# Patient Record
Sex: MALE | Race: OTHER | Employment: OTHER | URBAN - METROPOLITAN AREA
[De-identification: names, ages, dates, MRNs, and addresses within clinical notes are randomized per-mention and may not be internally consistent; named-entity substitution may affect disease eponyms.]

---

## 2022-07-26 ENCOUNTER — COMPREHENSIVE EXAM (OUTPATIENT)
Dept: URBAN - METROPOLITAN AREA CLINIC 32 | Facility: CLINIC | Age: 87
End: 2022-07-26

## 2022-07-26 DIAGNOSIS — H35.3131: ICD-10-CM

## 2022-07-26 DIAGNOSIS — H26.493: ICD-10-CM

## 2022-07-26 DIAGNOSIS — H04.123: ICD-10-CM

## 2022-07-26 DIAGNOSIS — H43.813: ICD-10-CM

## 2022-07-26 PROCEDURE — 92250 FUNDUS PHOTOGRAPHY W/I&R: CPT

## 2022-07-26 PROCEDURE — 99214 OFFICE O/P EST MOD 30 MIN: CPT

## 2022-07-26 PROCEDURE — 92015 DETERMINE REFRACTIVE STATE: CPT

## 2022-07-26 ASSESSMENT — KERATOMETRY
OS_K2POWER_DIOPTERS: 41.50
OD_AXISANGLE2_DEGREES: 85
OS_K1POWER_DIOPTERS: 42.25
OD_K2POWER_DIOPTERS: 41.25
OS_AXISANGLE_DEGREES: 5
OD_AXISANGLE_DEGREES: 175
OS_AXISANGLE2_DEGREES: 95
OD_K1POWER_DIOPTERS: 42.75

## 2022-07-26 ASSESSMENT — TONOMETRY
OD_IOP_MMHG: 12
OS_IOP_MMHG: 11

## 2022-07-27 ASSESSMENT — VISUAL ACUITY
OD_GLARE: 20/60
OD_CC: J1+
OS_GLARE: 20/50
OD_CC: 20/30+2
OS_CC: J1+
OS_CC: 20/20

## 2022-08-16 ENCOUNTER — SURGERY/PROCEDURE (OUTPATIENT)
Dept: URBAN - METROPOLITAN AREA CLINIC 32 | Facility: CLINIC | Age: 87
End: 2022-08-16

## 2022-08-16 DIAGNOSIS — H26.491: ICD-10-CM

## 2022-08-16 PROCEDURE — 66821 AFTER CATARACT LASER SURGERY: CPT

## 2022-08-18 ASSESSMENT — KERATOMETRY
OD_AXISANGLE_DEGREES: 175
OS_AXISANGLE_DEGREES: 5
OS_K1POWER_DIOPTERS: 42.25
OD_K2POWER_DIOPTERS: 41.25
OD_K1POWER_DIOPTERS: 42.75
OD_AXISANGLE2_DEGREES: 85
OS_AXISANGLE2_DEGREES: 95
OS_K2POWER_DIOPTERS: 41.50

## 2022-09-06 ENCOUNTER — SURGERY/PROCEDURE (OUTPATIENT)
Dept: URBAN - METROPOLITAN AREA CLINIC 32 | Facility: CLINIC | Age: 87
End: 2022-09-06

## 2022-09-06 DIAGNOSIS — H26.492: ICD-10-CM

## 2022-09-06 PROCEDURE — 66821 AFTER CATARACT LASER SURGERY: CPT

## 2022-09-08 ASSESSMENT — KERATOMETRY
OD_AXISANGLE_DEGREES: 175
OS_AXISANGLE_DEGREES: 5
OS_K2POWER_DIOPTERS: 41.50
OD_K1POWER_DIOPTERS: 42.75
OD_AXISANGLE2_DEGREES: 85
OS_K1POWER_DIOPTERS: 42.25
OD_K2POWER_DIOPTERS: 41.25
OS_AXISANGLE2_DEGREES: 95

## 2022-09-13 ENCOUNTER — POST-OP (OUTPATIENT)
Dept: URBAN - METROPOLITAN AREA CLINIC 32 | Facility: CLINIC | Age: 87
End: 2022-09-13

## 2022-09-13 DIAGNOSIS — Z98.890: ICD-10-CM

## 2022-09-13 PROCEDURE — 99024 POSTOP FOLLOW-UP VISIT: CPT

## 2022-09-13 PROCEDURE — 92015 DETERMINE REFRACTIVE STATE: CPT

## 2022-09-13 ASSESSMENT — KERATOMETRY
OD_AXISANGLE_DEGREES: 175
OS_AXISANGLE2_DEGREES: 95
OD_K2POWER_DIOPTERS: 41.25
OS_AXISANGLE_DEGREES: 5
OS_K1POWER_DIOPTERS: 42.25
OS_K2POWER_DIOPTERS: 41.75
OD_K1POWER_DIOPTERS: 42.75
OD_AXISANGLE2_DEGREES: 85

## 2022-09-13 ASSESSMENT — TONOMETRY
OS_IOP_MMHG: 12
OD_IOP_MMHG: 11

## 2022-09-13 ASSESSMENT — VISUAL ACUITY
OD_CC: 20/30-2
OS_CC: 20/25+2
OD_CC: J1+
OS_CC: J1+

## 2022-11-21 ENCOUNTER — EMERGENCY VISIT (OUTPATIENT)
Dept: URBAN - METROPOLITAN AREA CLINIC 32 | Facility: CLINIC | Age: 87
End: 2022-11-21

## 2022-11-21 DIAGNOSIS — H01.004: ICD-10-CM

## 2022-11-21 DIAGNOSIS — H04.123: ICD-10-CM

## 2022-11-21 DIAGNOSIS — H11.823: ICD-10-CM

## 2022-11-21 DIAGNOSIS — H01.001: ICD-10-CM

## 2022-11-21 DIAGNOSIS — H57.13: ICD-10-CM

## 2022-11-21 PROCEDURE — 99213 OFFICE O/P EST LOW 20 MIN: CPT

## 2022-11-21 RX ORDER — NEOMYCIN SULFATE, POLYMYXIN B SULFATE AND DEXAMETHASONE 3.5; 10000; 1 MG/G; [USP'U]/G; MG/G
OINTMENT OPHTHALMIC EVERY EVENING
Start: 2022-11-21

## 2022-11-21 RX ORDER — OLOPATADINE HYDROCHLORIDE 2 MG/ML
1 SOLUTION OPHTHALMIC
Start: 2022-11-21

## 2022-11-21 ASSESSMENT — KERATOMETRY
OD_AXISANGLE_DEGREES: 175
OS_K1POWER_DIOPTERS: 42.25
OS_AXISANGLE_DEGREES: 5
OD_K1POWER_DIOPTERS: 42.75
OD_AXISANGLE2_DEGREES: 85
OS_K2POWER_DIOPTERS: 41.75
OD_K2POWER_DIOPTERS: 41.25
OS_AXISANGLE2_DEGREES: 95

## 2022-11-21 ASSESSMENT — VISUAL ACUITY
OS_CC: 20/20
OD_CC: 20/20

## 2022-11-21 ASSESSMENT — TONOMETRY
OS_IOP_MMHG: 11
OD_IOP_MMHG: 13

## 2023-01-05 NOTE — PATIENT DISCUSSION
Body mass index is 41.37 kg/m². Morbid obesity complicates all aspects of disease management from diagnostic modalities to treatment. Weight loss encouraged and health benefits explained to patient.        Lens Material:

## 2023-10-06 ENCOUNTER — COMPREHENSIVE EXAM (OUTPATIENT)
Dept: URBAN - METROPOLITAN AREA CLINIC 32 | Facility: CLINIC | Age: 88
End: 2023-10-06

## 2023-10-06 DIAGNOSIS — H02.831: ICD-10-CM

## 2023-10-06 DIAGNOSIS — H57.813: ICD-10-CM

## 2023-10-06 DIAGNOSIS — H02.403: ICD-10-CM

## 2023-10-06 DIAGNOSIS — H04.123: ICD-10-CM

## 2023-10-06 DIAGNOSIS — H35.3131: ICD-10-CM

## 2023-10-06 DIAGNOSIS — H02.834: ICD-10-CM

## 2023-10-06 PROCEDURE — 99214 OFFICE O/P EST MOD 30 MIN: CPT

## 2023-10-06 PROCEDURE — 92015 DETERMINE REFRACTIVE STATE: CPT

## 2023-10-06 PROCEDURE — 92250 FUNDUS PHOTOGRAPHY W/I&R: CPT

## 2023-10-06 ASSESSMENT — KERATOMETRY
OS_K2POWER_DIOPTERS: 41.75
OD_K1POWER_DIOPTERS: 42.75
OD_K2POWER_DIOPTERS: 41.25
OD_AXISANGLE2_DEGREES: 85
OS_K1POWER_DIOPTERS: 42.25
OD_AXISANGLE_DEGREES: 175
OS_AXISANGLE_DEGREES: 5
OS_AXISANGLE2_DEGREES: 95

## 2023-10-06 ASSESSMENT — VISUAL ACUITY
OD_CC: J1+
OD_CC: 20/20-1
OS_CC: 20/20
OS_CC: J1+

## 2023-10-06 ASSESSMENT — TONOMETRY
OD_IOP_MMHG: 10
OS_IOP_MMHG: 14

## 2024-01-25 ENCOUNTER — EMERGENCY VISIT (OUTPATIENT)
Dept: URBAN - METROPOLITAN AREA CLINIC 32 | Facility: CLINIC | Age: 89
End: 2024-01-25

## 2024-01-25 DIAGNOSIS — H01.001: ICD-10-CM

## 2024-01-25 DIAGNOSIS — H01.004: ICD-10-CM

## 2024-01-25 PROCEDURE — 99213 OFFICE O/P EST LOW 20 MIN: CPT

## 2024-01-25 ASSESSMENT — KERATOMETRY
OD_AXISANGLE2_DEGREES: 88
OD_K1POWER_DIOPTERS: 42.75
OS_K1POWER_DIOPTERS: 42.25
OD_K2POWER_DIOPTERS: 41.00
OS_K2POWER_DIOPTERS: 41.25
OD_AXISANGLE_DEGREES: 178
OS_AXISANGLE2_DEGREES: 84
OS_AXISANGLE_DEGREES: 174

## 2024-01-25 ASSESSMENT — TONOMETRY
OD_IOP_MMHG: 11
OS_IOP_MMHG: 12

## 2024-01-25 ASSESSMENT — VISUAL ACUITY
OD_SC: 20/25+2
OS_CC: J1+
OS_SC: 20/20
OD_CC: J1+

## 2024-10-24 ENCOUNTER — COMPREHENSIVE EXAM (OUTPATIENT)
Dept: URBAN - METROPOLITAN AREA CLINIC 32 | Facility: CLINIC | Age: 89
End: 2024-10-24

## 2024-10-24 DIAGNOSIS — H02.834: ICD-10-CM

## 2024-10-24 DIAGNOSIS — H04.123: ICD-10-CM

## 2024-10-24 DIAGNOSIS — H02.831: ICD-10-CM

## 2024-10-24 DIAGNOSIS — H35.3131: ICD-10-CM

## 2024-10-24 PROCEDURE — 99214 OFFICE O/P EST MOD 30 MIN: CPT

## 2024-10-24 PROCEDURE — 92015 DETERMINE REFRACTIVE STATE: CPT

## 2024-10-24 PROCEDURE — 92134 CPTRZ OPH DX IMG PST SGM RTA: CPT
